# Patient Record
Sex: MALE | Race: WHITE | Employment: OTHER | URBAN - METROPOLITAN AREA
[De-identification: names, ages, dates, MRNs, and addresses within clinical notes are randomized per-mention and may not be internally consistent; named-entity substitution may affect disease eponyms.]

---

## 2024-07-08 ENCOUNTER — OFFICE VISIT (OUTPATIENT)
Dept: URGENT CARE | Facility: CLINIC | Age: 59
End: 2024-07-08
Payer: COMMERCIAL

## 2024-07-08 VITALS
HEIGHT: 69 IN | RESPIRATION RATE: 16 BRPM | BODY MASS INDEX: 24.73 KG/M2 | OXYGEN SATURATION: 99 % | HEART RATE: 76 BPM | TEMPERATURE: 98.4 F | WEIGHT: 167 LBS

## 2024-07-08 DIAGNOSIS — H60.392 OTHER INFECTIVE ACUTE OTITIS EXTERNA OF LEFT EAR: Primary | ICD-10-CM

## 2024-07-08 DIAGNOSIS — L02.31 GLUTEAL ABSCESS: ICD-10-CM

## 2024-07-08 PROCEDURE — G0382 LEV 3 HOSP TYPE B ED VISIT: HCPCS | Performed by: PHYSICIAN ASSISTANT

## 2024-07-08 PROCEDURE — S9083 URGENT CARE CENTER GLOBAL: HCPCS | Performed by: PHYSICIAN ASSISTANT

## 2024-07-08 RX ORDER — TAMSULOSIN HYDROCHLORIDE 0.4 MG/1
0.4 CAPSULE ORAL DAILY
COMMUNITY
Start: 2024-06-10

## 2024-07-08 RX ORDER — TEMAZEPAM 7.5 MG/1
7.5 CAPSULE ORAL
COMMUNITY

## 2024-07-08 RX ORDER — VALSARTAN 80 MG/1
120 TABLET ORAL 2 TIMES DAILY
COMMUNITY
Start: 2024-01-30

## 2024-07-08 RX ORDER — AMIODARONE HYDROCHLORIDE 200 MG/1
200 TABLET ORAL DAILY
COMMUNITY
Start: 2024-04-22

## 2024-07-08 RX ORDER — CHLORHEXIDINE GLUCONATE ORAL RINSE 1.2 MG/ML
SOLUTION DENTAL
COMMUNITY
Start: 2024-05-21

## 2024-07-08 RX ORDER — ASPIRIN 81 MG/1
81 TABLET ORAL DAILY
COMMUNITY
Start: 2024-01-11

## 2024-07-08 RX ORDER — METOPROLOL SUCCINATE 25 MG/1
12.5 TABLET, EXTENDED RELEASE ORAL DAILY
COMMUNITY
Start: 2024-05-20

## 2024-07-08 RX ORDER — ATORVASTATIN CALCIUM 40 MG/1
40 TABLET, FILM COATED ORAL DAILY
COMMUNITY
Start: 2024-04-30 | End: 2025-04-30

## 2024-07-08 RX ORDER — CILOSTAZOL 50 MG/1
TABLET ORAL
COMMUNITY

## 2024-07-08 RX ORDER — TAMSULOSIN HYDROCHLORIDE 0.4 MG/1
0.4 CAPSULE ORAL DAILY
COMMUNITY
Start: 2024-04-12

## 2024-07-08 RX ORDER — SPIRONOLACTONE 25 MG/1
1 TABLET ORAL
COMMUNITY
Start: 2024-05-08

## 2024-07-08 RX ORDER — WARFARIN SODIUM 2.5 MG/1
TABLET ORAL
COMMUNITY
Start: 2024-04-05

## 2024-07-08 RX ORDER — ROSUVASTATIN CALCIUM 10 MG/1
TABLET, COATED ORAL
COMMUNITY

## 2024-07-08 RX ORDER — DOXYCYCLINE HYCLATE 100 MG/1
CAPSULE ORAL
COMMUNITY
Start: 2024-06-04 | End: 2024-07-08

## 2024-07-08 RX ORDER — ACETAMINOPHEN 500 MG
1000 TABLET ORAL 4 TIMES DAILY
COMMUNITY
Start: 2024-01-11

## 2024-07-08 RX ORDER — OFLOXACIN 3 MG/ML
SOLUTION/ DROPS OPHTHALMIC
Qty: 5 ML | Refills: 0 | Status: SHIPPED | OUTPATIENT
Start: 2024-07-08

## 2024-07-08 RX ORDER — LANOLIN ALCOHOL/MO/W.PET/CERES
400 CREAM (GRAM) TOPICAL DAILY
COMMUNITY
Start: 2024-01-11

## 2024-07-08 RX ORDER — DOXYCYCLINE 100 MG/1
100 TABLET ORAL 2 TIMES DAILY
Qty: 14 TABLET | Refills: 0 | Status: SHIPPED | OUTPATIENT
Start: 2024-07-08 | End: 2024-07-15

## 2024-07-08 RX ORDER — DAPAGLIFLOZIN 10 MG/1
10 TABLET, FILM COATED ORAL DAILY
COMMUNITY
Start: 2024-07-08

## 2024-07-08 RX ORDER — TRAZODONE HYDROCHLORIDE 50 MG/1
1 TABLET ORAL
COMMUNITY
Start: 2024-05-21

## 2024-07-08 NOTE — PATIENT INSTRUCTIONS
Take doxycycline with food.  Do not consume dairy or reflux medications 2 hours before to hours after as this inhibit the absorption of the antibiotic.  Common side effects include nausea, vomiting, diarrhea, upset stomach.  Take a probiotic to avoid this.  Avoid sun exposure due to photophobia.

## 2024-07-08 NOTE — PROGRESS NOTES
[facility]        NAME: Cheryl Reese is a 58 y.o. male  : 1965    MRN: [id number]  DATE: 2024  TIME: 3:59 PM    Assessment and Plan   Other infective acute otitis externa of left ear [H60.392]  1. Other infective acute otitis externa of left ear  ofloxacin (OCUFLOX) 0.3 % ophthalmic solution      2. Gluteal abscess  doxycycline (ADOXA) 100 MG tablet          Doxycycline with warfarin prescriber due to interaction.  Also advised patient to discuss doxycycline prescription with cardiac doctor due to medical complexity.  Patient verbalized understanding.    Patient Instructions   Medications as prescribed.  Sitz bath.    Follow up with PCP in 3-5 days.  Proceed to  ER if symptoms worsen.    If tests have been performed at [facility], our office will contact you with results if changes need to be made to the care plan discussed with you at the visit.  You can review your full results on [facility].    Chief Complaint     Chief Complaint   Patient presents with    Earache     Left ear pain started 1 week ago. Started Debrox 4 days ago. Drainage started yesterday.     Cyst     Cyst on left side of rectum started about 3 days ago. Started draining red last night.          History of Present Illness       Patient is a 58-year-old male significant past medical history of heart failure on LVAD and warfarin presents the office planing of left ear pain for 1 week.  States he was having trouble hearing out of it and did a telemedicine visit.  Told him to use over-the-counter Debrox.  He began complaining of pain and drainage since yesterday.    He is also complaining of gluteal cyst to left side for approximately 3 days.  States it has since ruptured and expressed bloody purulent discharge.  Pain has improved but still present.  Patient has history of pilonidal cyst and gluteal cysts with surgical repair.  Denies fevers or chills.        Review of Systems   Review of Systems   Constitutional:   Negative for chills and fever.   HENT:  Positive for ear discharge, ear pain and hearing loss. Negative for congestion and sore throat.    Respiratory:  Negative for cough.    Gastrointestinal:  Negative for abdominal pain, anal bleeding, blood in stool, diarrhea, nausea and vomiting.         Current Medications       Current Outpatient Medications:     acetaminophen (TYLENOL) 500 mg tablet, Take 1,000 mg by mouth 4 (four) times a day, Disp: , Rfl:     amiodarone 200 mg tablet, Take 200 mg by mouth daily, Disp: , Rfl:     Arginine 1000 MG TABS, Take 1,000 mg by mouth, Disp: , Rfl:     aspirin (ECOTRIN LOW STRENGTH) 81 mg EC tablet, Take 81 mg by mouth daily, Disp: , Rfl:     atorvastatin (LIPITOR) 40 mg tablet, Take 40 mg by mouth daily, Disp: , Rfl:     chlorhexidine (PERIDEX) 0.12 % solution, RINSE AND SPIT DAILY, Disp: , Rfl:     cilostazol (PLETAL) 50 mg tablet, , Disp: , Rfl:     dapagliflozin (Farxiga) 10 MG tablet, Take 10 mg by mouth daily, Disp: , Rfl:     doxycycline (ADOXA) 100 MG tablet, Take 1 tablet (100 mg total) by mouth 2 (two) times a day for 7 days, Disp: 14 tablet, Rfl: 0    magnesium Oxide (MAG-OX) 400 mg TABS, Take 400 mg by mouth daily, Disp: , Rfl:     metoprolol succinate (TOPROL-XL) 25 mg 24 hr tablet, Take 12.5 mg by mouth daily, Disp: , Rfl:     ofloxacin (OCUFLOX) 0.3 % ophthalmic solution, Apply 7 to 10 drops to the left ear once a day for 1 week, Disp: 5 mL, Rfl: 0    rosuvastatin (CRESTOR) 10 MG tablet, , Disp: , Rfl:     spironolactone (ALDACTONE) 25 mg tablet, Take 1 tablet by mouth daily at bedtime, Disp: , Rfl:     tamsulosin (FLOMAX) 0.4 mg, Take 0.4 mg by mouth daily, Disp: , Rfl:     tamsulosin (FLOMAX) 0.4 mg, Take 0.4 mg by mouth daily, Disp: , Rfl:     temazepam (RESTORIL) 7.5 mg capsule, Take 7.5 mg by mouth, Disp: , Rfl:     traZODone (DESYREL) 50 mg tablet, Take 1 tablet by mouth daily at bedtime, Disp: , Rfl:     valsartan (DIOVAN) 80 mg tablet, Take 120 mg by mouth 2  "(two) times a day, Disp: , Rfl:     warfarin (COUMADIN) 2.5 mg tablet, Take 1-1.5 tablets by mouth daily as instructed by [facility], Disp: , Rfl:     tamsulosin (FLOMAX) 0.4 mg, Take 0.4 mg by mouth daily, Disp: , Rfl:     Current Allergies     Allergies as of 07/08/2024 - Reviewed 07/08/2024   Allergen Reaction Noted    Amoxicillin Rash 06/04/2024    Chlorhexidine Rash 03/24/2023    Cortisone Itching 06/29/2015    Diphenhydramine Itching 06/29/2015    Isothiazolinone chloride Rash 03/29/2023    Methylisothiazolinone Rash 06/29/2015            The following portions of the patient's history were reviewed and updated as appropriate: allergies, current medications, past family history, past medical history, past social history, past surgical history and problem list.     Past Medical History:   Diagnosis Date    HF (heart failure) ([facility])     LVAD (left ventricular assist device) present ([facility])        Past Surgical History:   Procedure Laterality Date    CARDIAC SURGERY      quad bipass       Family History   Problem Relation Age of Onset    No Known Problems Mother     Alzheimer's disease Father          Medications have been verified.        Objective   Pulse 76   Temp 98.4 °F (36.9 °C)   Resp 16   Ht 5' 9\" (1.753 m)   Wt 75.8 kg (167 lb)   SpO2 99%   BMI 24.66 kg/m²   No LMP for male patient.       Physical Exam     Physical Exam  Vitals and nursing note reviewed. Chaperone present: Dawn Loche.   Constitutional:       Appearance: Normal appearance. He is well-developed.   HENT:      Head: Normocephalic and atraumatic.      Right Ear: Tympanic membrane, ear canal and external ear normal.      Left Ear: Tympanic membrane, ear canal and external ear normal. Drainage, swelling and tenderness present. Tympanic membrane is not erythematous.      Nose: Nose normal.      Mouth/Throat:      Pharynx: Uvula midline.   Eyes:      General: Lids are normal.      Conjunctiva/sclera: Conjunctivae normal.      Pupils: Pupils are " equal, round, and reactive to light.   Cardiovascular:      Rate and Rhythm: Normal rate and regular rhythm.      Heart sounds: Normal heart sounds. No murmur heard.     No friction rub. No gallop.   Pulmonary:      Effort: Pulmonary effort is normal.      Breath sounds: Normal breath sounds. No stridor. No wheezing or rales.   Genitourinary:      Musculoskeletal:         General: Normal range of motion.      Cervical back: Neck supple.   Skin:     General: Skin is warm and dry.      Capillary Refill: Capillary refill takes less than 2 seconds.   Neurological:      Mental Status: He is alert.